# Patient Record
Sex: FEMALE | Race: WHITE | NOT HISPANIC OR LATINO | Employment: UNEMPLOYED | URBAN - METROPOLITAN AREA
[De-identification: names, ages, dates, MRNs, and addresses within clinical notes are randomized per-mention and may not be internally consistent; named-entity substitution may affect disease eponyms.]

---

## 2023-04-03 ENCOUNTER — HOSPITAL ENCOUNTER (EMERGENCY)
Facility: HOSPITAL | Age: 18
Discharge: HOME | End: 2023-04-03
Attending: STUDENT IN AN ORGANIZED HEALTH CARE EDUCATION/TRAINING PROGRAM
Payer: MEDICAID

## 2023-04-03 VITALS
SYSTOLIC BLOOD PRESSURE: 123 MMHG | BODY MASS INDEX: 23.04 KG/M2 | RESPIRATION RATE: 20 BRPM | WEIGHT: 134.94 LBS | DIASTOLIC BLOOD PRESSURE: 74 MMHG | OXYGEN SATURATION: 99 % | HEART RATE: 68 BPM | HEIGHT: 64 IN | TEMPERATURE: 99 F

## 2023-04-03 DIAGNOSIS — S39.012A STRAIN OF LUMBAR REGION, INITIAL ENCOUNTER: Primary | ICD-10-CM

## 2023-04-03 LAB
APPEARANCE UR: CLEAR
B-HCG UR QL: NEGATIVE
BACTERIA #/AREA URNS AUTO: ABNORMAL /HPF
BILIRUB UR QL STRIP.AUTO: NEGATIVE MG/DL
COLOR UR AUTO: COLORLESS
CTP QC/QA: YES
GLUCOSE UR QL STRIP.AUTO: NORMAL MG/DL
HYALINE CASTS #/AREA URNS LPF: ABNORMAL /LPF
KETONES UR QL STRIP.AUTO: NEGATIVE MG/DL
LEUKOCYTE ESTERASE UR QL STRIP.AUTO: NEGATIVE UNIT/L
NITRITE UR QL STRIP.AUTO: NEGATIVE
PH UR STRIP.AUTO: 7 [PH]
PROT UR QL STRIP.AUTO: NEGATIVE MG/DL
RBC #/AREA URNS AUTO: ABNORMAL /HPF
RBC UR QL AUTO: NEGATIVE UNIT/L
SP GR UR STRIP.AUTO: 1
SQUAMOUS #/AREA URNS LPF: ABNORMAL /HPF
UROBILINOGEN UR STRIP-ACNC: NORMAL MG/DL
WBC #/AREA URNS AUTO: ABNORMAL /HPF

## 2023-04-03 PROCEDURE — 99283 EMERGENCY DEPT VISIT LOW MDM: CPT

## 2023-04-03 PROCEDURE — 81025 URINE PREGNANCY TEST: CPT | Performed by: PHYSICIAN ASSISTANT

## 2023-04-03 PROCEDURE — 81001 URINALYSIS AUTO W/SCOPE: CPT | Performed by: PHYSICIAN ASSISTANT

## 2023-04-03 PROCEDURE — 25000003 PHARM REV CODE 250: Performed by: PHYSICIAN ASSISTANT

## 2023-04-03 RX ORDER — INDOMETHACIN 25 MG/1
25 CAPSULE ORAL
Status: COMPLETED | OUTPATIENT
Start: 2023-04-03 | End: 2023-04-03

## 2023-04-03 RX ORDER — BACLOFEN 20 MG/1
20 TABLET ORAL 3 TIMES DAILY PRN
Qty: 20 TABLET | Refills: 0 | Status: SHIPPED | OUTPATIENT
Start: 2023-04-03

## 2023-04-03 RX ORDER — BACLOFEN 10 MG/1
10 TABLET ORAL ONCE
Status: COMPLETED | OUTPATIENT
Start: 2023-04-03 | End: 2023-04-03

## 2023-04-03 RX ADMIN — INDOMETHACIN 25 MG: 25 CAPSULE ORAL at 07:04

## 2023-04-03 RX ADMIN — BACLOFEN 10 MG: 10 TABLET ORAL at 07:04

## 2023-04-03 NOTE — ED PROVIDER NOTES
Encounter Date: 4/3/2023       History     Chief Complaint   Patient presents with    Back Pain     C/o lower back pain x 3 days. States was heavy lifting at work. Hx of back pain     16 yo F present sto ED c/o 3 day hx of diffuse low back pain. Patient reports heavy lifting at work a day before symptoms began. Reports pain radiates into b/l legs & is often burning in nature. Reports similar episode approx 1 month ago. Seen at [facility] ED at that time & told it was likely sciatica & discharged w/ muscle relaxers. Reports pain resolved up until this recent episode. Denies numbness, paresthesia, saddle anesthesia, incontinence, bowel/bladder retention, dysuria, hematuria, abdominal pain, abdominal distension, N/V/D, constipation, blood in stool, appetite changes, unexplained weight loss, night sweats, generalized weakness, fatigue, inability to bear weight/ambulate. VSS on arrival patient in NAD.    Review of patient's allergies indicates:  No Known Allergies  History reviewed. No pertinent past medical history.  History reviewed. No pertinent surgical history.  History reviewed. No pertinent family history.  Social History     Tobacco Use    Smoking status: Every Day     Types: Cigarettes, Vaping with nicotine    Smokeless tobacco: Never   Substance Use Topics    Alcohol use: Never    Drug use: Never     Review of Systems   All other systems reviewed and are negative.    Physical Exam     Initial Vitals [04/03/23 1712]   BP Pulse Resp Temp SpO2   123/74 68 20 98.8 °F (37.1 °C) 99 %      MAP       --         Physical Exam    Nursing note and vitals reviewed.  Constitutional: She appears well-developed and well-nourished. She is not diaphoretic. No distress.   HENT:   Head: Normocephalic and atraumatic.   Mouth/Throat: Oropharynx is clear and moist.   Eyes: Conjunctivae and EOM are normal. Pupils are equal, round, and reactive to light.   Neck: Neck supple.   Normal range of motion.  Cardiovascular:  Normal  rate, regular rhythm, normal heart sounds and intact distal pulses.     Exam reveals no gallop and no friction rub.       No murmur heard.  Pulmonary/Chest: Breath sounds normal. No respiratory distress. She has no wheezes. She has no rhonchi. She has no rales.   Abdominal: Abdomen is soft. Bowel sounds are normal. She exhibits no distension. There is no abdominal tenderness. There is no rebound and no guarding.   Musculoskeletal:         General: No edema.      Cervical back: Normal, normal range of motion and neck supple.      Thoracic back: Normal.      Lumbar back: Tenderness (b/l paraspinal muscles) present. No swelling, edema, deformity, signs of trauma, spasms or bony tenderness. Normal range of motion. Positive right straight leg raise test and positive left straight leg raise test.     Neurological: She is alert and oriented to person, place, and time. She has normal strength and normal reflexes. No sensory deficit.   Skin: Skin is warm and dry. No rash noted. No erythema. No pallor.   Psychiatric: She has a normal mood and affect. Thought content normal.       ED Course   Procedures  Labs Reviewed   URINALYSIS, REFLEX TO URINE CULTURE - Abnormal; Notable for the following components:       Result Value    Color, UA Colorless (*)     Squamous Epithelial Cells, UA Few (*)     All other components within normal limits   POCT URINE PREGNANCY          Imaging Results              X-Ray Lumbar Spine 2 Or 3 Views (Final result)  Result time 04/03/23 19:05:07      Final result by Ethan Gallego MD (04/03/23 19:05:07)                   Impression:      No acute radiographic findings identified.      Electronically signed by: Ethan Gallego  Date:    04/03/2023  Time:    19:05               Narrative:    EXAMINATION:  XR LUMBAR SPINE 2 OR 3 VIEWS    CLINICAL HISTORY:  Back pain    TECHNIQUE:  Frontal and lateral radiographs of the lumbar spine    COMPARISON:  No relevant comparison studies available at the time of  dictation.    FINDINGS:  For the purposes of this report, there are 5 lumbar vertebral bodies with L1 bearing diminutive ribs and partial lumbarization of S1.  Vertebral body heights are maintained.  Straightening of the lumbar lordosis.  No significant listhesis.  Lumbar disc spaces within normal limits.                                       Medications   indomethacin capsule 25 mg (25 mg Oral Given 4/3/23 1906)   baclofen tablet 10 mg (10 mg Oral Given 4/3/23 1906)     Medical Decision Making:   Clinical Tests:   Lab Tests: Ordered and Reviewed  Radiological Study: Ordered and Reviewed  No evidence of infection or stone on UA. XR unremarkable w/o acute osseous abnormality. No evidence of spinal cord compression on exam. Will discharge w/ muscle relaxer for home. Referral placed to IM clinic to establish PCP. ED precautions given for new or worsening symptoms.     APC / Resident Notes:   I was not physically present during the history, exam or disposition of this patient. I was available at all times for consultation. (Zmora)                   Clinical Impression:   Final diagnoses:  [S39.012A] Strain of lumbar region, initial encounter (Primary)        ED Disposition Condition    Discharge Good          ED Prescriptions       Medication Sig Dispense Start Date End Date Auth. Provider    baclofen (LIORESAL) 20 MG tablet Take 1 tablet (20 mg total) by mouth 3 (three) times daily as needed (muscle cramps). 20 tablet 4/3/2023 -- JEFFREY Graf          Follow-up Information       Follow up With Specialties Details Why Contact Info Additional Information    [facility] Medicine Internal Medicine In 2 weeks  [address] [facility] [address] [phone] Internal Medicine Clinic Entrance #1    [facility] - Emergency Dept Emergency Medicine  As needed [address] [facility] [address] [phone]              JEFFREY Graf  04/03/23 1913        Robert Casiano MD  04/04/23 0743

## 2023-04-03 NOTE — Clinical Note
"Vanessa Sancheza" Shiela was seen and treated in our emergency department on 4/3/2023.  She may return to work on 04/04/2023.       If you have any questions or concerns, please don't hesitate to call.      TAB Echevarria RN    "

## 2023-04-04 NOTE — DISCHARGE INSTRUCTIONS
Report to Emergency Department if symptoms return or worsen; [facility] Medicine Clinic Within 1 to 2 days, It is important that you follow up with your primary care provider or specialist if indicated for further evaluation, workup, and treatment as necessary. The exam and treatment you received in Emergency Department was for an urgent problem and NOT INTENDED AS COMPLETE CARE. It is important that you FOLLOW UP with a doctor for ongoing care. If your symptoms become WORSE or you DO NOT IMPROVE and you are unable to reach your health care provider, you should RETURN to the Emergency Department. The Emergency Department provider has provided a PRELIMINARY INTERPRETATION of all your studies. A final interpretation may be done after you are discharged. If a change in your diagnosis or treatment is needed WE WILL CONTACT YOU. It is critical that we have a CURRENT PHONE NUMBER FOR YOU.

## 2024-05-18 ENCOUNTER — HOSPITAL ENCOUNTER (EMERGENCY)
Facility: HOSPITAL | Age: 19
Discharge: HOME | End: 2024-05-18
Attending: EMERGENCY MEDICINE
Payer: COMMERCIAL

## 2024-05-18 VITALS
HEIGHT: 63 IN | HEART RATE: 61 BPM | DIASTOLIC BLOOD PRESSURE: 62 MMHG | OXYGEN SATURATION: 99 % | SYSTOLIC BLOOD PRESSURE: 97 MMHG | WEIGHT: 130 LBS | BODY MASS INDEX: 23.04 KG/M2 | TEMPERATURE: 99 F | RESPIRATION RATE: 18 BRPM
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
## 2024-05-18 DIAGNOSIS — S16.1XXA ACUTE STRAIN OF NECK MUSCLE, INITIAL ENCOUNTER: ICD-10-CM

## 2024-05-18 LAB
ALBUMIN SERPL-MCNC: 4.2 G/DL (ref 3.5–5)
ALBUMIN/GLOB SERPL: 1.3 RATIO (ref 1.1–2)
ALP SERPL-CCNC: 59 UNIT/L (ref 40–150)
ALT SERPL-CCNC: 16 UNIT/L (ref 0–55)
ANION GAP SERPL CALC-SCNC: 12 MEQ/L
APTT PPP: 37.5 SECONDS (ref 23.2–33.7)
AST SERPL-CCNC: 20 UNIT/L (ref 5–34)
B-HCG UR QL: NEGATIVE
BASOPHILS # BLD AUTO: 0.07 X10(3)/MCL
BASOPHILS NFR BLD AUTO: 1.1 %
BILIRUB SERPL-MCNC: 0.4 MG/DL
BUN SERPL-MCNC: 5.2 MG/DL (ref 7–18.7)
CALCIUM SERPL-MCNC: 8.9 MG/DL (ref 8.4–10.2)
CHLORIDE SERPL-SCNC: 111 MMOL/L (ref 98–107)
CO2 SERPL-SCNC: 19 MMOL/L (ref 22–29)
CREAT SERPL-MCNC: 0.64 MG/DL (ref 0.55–1.02)
CREAT/UREA NIT SERPL: 8
CTP QC/QA: YES
EOSINOPHIL # BLD AUTO: 0.01 X10(3)/MCL (ref 0–0.9)
EOSINOPHIL NFR BLD AUTO: 0.2 %
ERYTHROCYTE [DISTWIDTH] IN BLOOD BY AUTOMATED COUNT: 21.5 % (ref 11.5–17)
GFR SERPLBLD CREATININE-BSD FMLA CKD-EPI: >60 ML/MIN/1.73/M2
GLOBULIN SER-MCNC: 3.2 GM/DL (ref 2.4–3.5)
GLUCOSE SERPL-MCNC: 99 MG/DL (ref 74–100)
HCT VFR BLD AUTO: 32 % (ref 37–47)
HGB BLD-MCNC: 9.7 G/DL (ref 12–16)
IMM GRANULOCYTES # BLD AUTO: 0.01 X10(3)/MCL (ref 0–0.04)
IMM GRANULOCYTES NFR BLD AUTO: 0.2 %
INR PPP: 2.3
LYMPHOCYTES # BLD AUTO: 0.77 X10(3)/MCL (ref 0.6–4.6)
LYMPHOCYTES NFR BLD AUTO: 12.3 %
MCH RBC QN AUTO: 23.5 PG (ref 27–31)
MCHC RBC AUTO-ENTMCNC: 30.3 G/DL (ref 33–36)
MCV RBC AUTO: 77.7 FL (ref 80–94)
MONOCYTES # BLD AUTO: 0.77 X10(3)/MCL (ref 0.1–1.3)
MONOCYTES NFR BLD AUTO: 12.3 %
NEUTROPHILS # BLD AUTO: 4.64 X10(3)/MCL (ref 2.1–9.2)
NEUTROPHILS NFR BLD AUTO: 73.9 %
NRBC BLD AUTO-RTO: 0 %
PLATELET # BLD AUTO: 284 X10(3)/MCL (ref 130–400)
PMV BLD AUTO: 9.9 FL (ref 7.4–10.4)
POTASSIUM SERPL-SCNC: 3.5 MMOL/L (ref 3.5–5.1)
PROT SERPL-MCNC: 7.4 GM/DL (ref 6.4–8.3)
PROTHROMBIN TIME: 25.2 SECONDS (ref 12.5–14.5)
RBC # BLD AUTO: 4.12 X10(6)/MCL (ref 4.2–5.4)
SODIUM SERPL-SCNC: 142 MMOL/L (ref 136–145)
WBC # SPEC AUTO: 6.27 X10(3)/MCL (ref 4.5–11.5)

## 2024-05-18 PROCEDURE — 99284 EMERGENCY DEPT VISIT MOD MDM: CPT | Mod: 25

## 2024-05-18 PROCEDURE — 85025 COMPLETE CBC W/AUTO DIFF WBC: CPT

## 2024-05-18 PROCEDURE — 25000003 PHARM REV CODE 250: Performed by: EMERGENCY MEDICINE

## 2024-05-18 PROCEDURE — 85730 THROMBOPLASTIN TIME PARTIAL: CPT

## 2024-05-18 PROCEDURE — 80053 COMPREHEN METABOLIC PANEL: CPT

## 2024-05-18 PROCEDURE — 81025 URINE PREGNANCY TEST: CPT

## 2024-05-18 PROCEDURE — 85610 PROTHROMBIN TIME: CPT

## 2024-05-18 RX ORDER — HYDROCODONE BITARTRATE AND ACETAMINOPHEN 5; 325 MG/1; MG/1
1 TABLET ORAL EVERY 6 HOURS PRN
Qty: 15 TABLET | Refills: 0 | Status: SHIPPED | OUTPATIENT
Start: 2024-05-18

## 2024-05-18 RX ORDER — HYDROCODONE BITARTRATE AND ACETAMINOPHEN 5; 325 MG/1; MG/1
1 TABLET ORAL
Status: COMPLETED | OUTPATIENT
Start: 2024-05-18 | End: 2024-05-18

## 2024-05-18 RX ADMIN — HYDROCODONE BITARTRATE AND ACETAMINOPHEN 1 TABLET: 5; 325 TABLET ORAL at 09:05

## 2024-05-18 NOTE — Clinical Note
"Vanessa Sancheza" Shiela was seen and treated in our emergency department on 5/18/2024.  She may return to work on 05/20/2024.       If you have any questions or concerns, please don't hesitate to call.      KAMALJIT English LPN    "
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
## 2024-09-24 ENCOUNTER — HOSPITAL ENCOUNTER (EMERGENCY)
Facility: HOSPITAL | Age: 19
Discharge: HOME | End: 2024-09-24
Attending: EMERGENCY MEDICINE
Payer: MEDICAID

## 2024-09-24 VITALS
WEIGHT: 140 LBS | BODY MASS INDEX: 23.9 KG/M2 | TEMPERATURE: 98 F | DIASTOLIC BLOOD PRESSURE: 76 MMHG | OXYGEN SATURATION: 100 % | SYSTOLIC BLOOD PRESSURE: 123 MMHG | HEART RATE: 57 BPM | RESPIRATION RATE: 18 BRPM | HEIGHT: 64 IN

## 2024-09-24 DIAGNOSIS — K02.9 DENTAL CARIES: ICD-10-CM

## 2024-09-24 DIAGNOSIS — M26.622 ARTHRALGIA OF LEFT TEMPOROMANDIBULAR JOINT: Primary | ICD-10-CM

## 2024-09-24 LAB — B-HCG UR QL: NEGATIVE

## 2024-09-24 PROCEDURE — 99284 EMERGENCY DEPT VISIT MOD MDM: CPT

## 2024-09-24 PROCEDURE — 81025 URINE PREGNANCY TEST: CPT

## 2024-09-24 RX ORDER — METHYLPREDNISOLONE 4 MG/1
TABLET ORAL
Qty: 21 EACH | Refills: 0 | Status: SHIPPED | OUTPATIENT
Start: 2024-09-24 | End: 2024-10-15

## 2024-09-24 RX ORDER — HYDROCODONE BITARTRATE AND ACETAMINOPHEN 5; 325 MG/1; MG/1
1 TABLET ORAL EVERY 6 HOURS PRN
Qty: 12 TABLET | Refills: 0 | Status: SHIPPED | OUTPATIENT
Start: 2024-09-24

## 2024-09-24 RX ORDER — AMOXICILLIN AND CLAVULANATE POTASSIUM 875; 125 MG/1; MG/1
1 TABLET, FILM COATED ORAL 2 TIMES DAILY
Qty: 14 TABLET | Refills: 0 | Status: SHIPPED | OUTPATIENT
Start: 2024-09-24

## 2024-09-24 NOTE — ED TRIAGE NOTES
Pt complaint of pain to left jaw, especially at opening and clenching of jaw at waking this am without known injury.

## 2024-09-24 NOTE — DISCHARGE INSTRUCTIONS
For pain, take Medrol Dosepak as instructed by the pharmacy.  During the day you may take Tylenol.  For worsening pain, okay to take Norco every 6 hours.  This medication may make you drowsy     For dental caries, take Augmentin as instructed.  Please take antibiotic to completion to avoid any complications or reinfection.  Recommend follow up with a dentist.      Return to ER as needed.

## 2024-09-24 NOTE — Clinical Note
"Vanessa Sancheza"CarminaShiela was seen and treated in our emergency department on 9/24/2024.  She may return to work on 09/25/2024.       If you have any questions or concerns, please don't hesitate to call.      Kylah CLEMENS    "

## 2024-09-24 NOTE — ED PROVIDER NOTES
Encounter Date: 9/24/2024       History     Chief Complaint   Patient presents with    Jaw Pain     Pt complaint of pain to left jaw, especially at opening and clenching of jaw at waking this am without known injury.      See [facility] for details     The history is provided by the patient.     Review of patient's allergies indicates:  No Known Allergies  Past Medical History:   Diagnosis Date    Aortic valve insufficiency      Past Surgical History:   Procedure Laterality Date    AORTIC VALVE REPLACEMENT      2021     No family history on file.  Social History     Tobacco Use    Smoking status: Every Day     Types: Cigarettes, Vaping with nicotine    Smokeless tobacco: Never   Substance Use Topics    Alcohol use: Never    Drug use: Never     Review of Systems   Constitutional:  Negative for chills and fever.   HENT:  Negative for dental problem, ear discharge, ear pain and sore throat.         Jaw pain   Respiratory:  Negative for shortness of breath.    Cardiovascular:  Negative for chest pain.   All other systems reviewed and are negative.      Physical Exam     Initial Vitals [09/24/24 1802]   BP Pulse Resp Temp SpO2   123/76 (!) 57 18 98.1 °F (36.7 °C) 100 %      MAP       --         Physical Exam    Nursing note and vitals reviewed.  Constitutional: She appears well-developed and well-nourished. No distress.   HENT:   Head: Normocephalic and atraumatic.   Right Ear: Tympanic membrane and ear canal normal.   Left Ear: Tympanic membrane and ear canal normal.   Nose: Nose normal.   Mouth/Throat: Uvula is midline, oropharynx is clear and moist and mucous membranes are normal. Dental caries present.       Eyes: Conjunctivae and EOM are normal.   Neck:   Normal range of motion.  Cardiovascular:  Normal rate.           Pulmonary/Chest: No respiratory distress.   Musculoskeletal:         General: Normal range of motion.      Cervical back: Normal range of motion.      Comments: TTP to left TMJ, no crepitus or abnormal  movement demonstrated on exam. No clicking.      Neurological: She is alert and oriented to person, place, and time.   Skin: Skin is warm and dry.   Psychiatric: She has a normal mood and affect. Thought content normal.         ED Course   Procedures  Labs Reviewed   PREGNANCY TEST, URINE RAPID - Normal       Result Value    hCG Qualitative, Urine Negative            Imaging Results    None          Medications - No data to display  Medical Decision Making  18-year-old female presents to the ER for evaluation of left jaw pain x1 day.  Patient reports that she woke up this morning with significant left-sided jaw pain.  She reports that the pain has been constant.  She reports worsening pain whenever she tries to open or close her mouth.  She also reports that she has been unable to choose secondary to her pain.  She does report some intermittent clicking sensations.  She denies any history of this problem in the past.  She denies any known dental problems.    On physical exam, the patient is tender to palpation to the left TMJ.  She does not have any crepitus or abnormal movement on exam.  She does have some decreased range of motion to the jaw secondary to pain.  On evaluation of her mouth, she does have a large dental bharathi to the left most posterior molar.  Patient denies any tenderness to this tooth or drainage.  She is unsure when she last saw a dentist.  Given the patient's symptoms, suspect TMJ versus dental bharathi is causing her pain.  At this time, recommend treatment with Medrol Dosepak as the patient is on blood thinner secondary to a mechanical valve replacement.  We will also give her a short course of pain medication help with worsening pain.  Given the patient's large dental bharathi on exam I can not completely rule out this is a etiology of her pain.  We will place on cephalexin to avoid any formation of large dental abscess or worsening infection.  Recommend follow up with a dentist.  We also discussed  conservative measures and TMJ exercises she can try at home to help with her discomfort.  Patient verbalized her understanding of this and we will be discharged home.    Amount and/or Complexity of Data Reviewed  Labs:  Decision-making details documented in ED Course.    Risk  Prescription drug management.      Additional MDM:   Differential Diagnosis:   Other: The following diagnoses were also considered and will be evaluated: Dental abscess, Jaw dislocation and Injury.            ED Course as of 09/24/24 1859   Tue Sep 24, 2024   1838 hCG Qualitative, Urine: Negative [LM]      ED Course User Index  [LM] Kathi Bah PA                           Clinical Impression:  Final diagnoses:  [M26.622] Arthralgia of left temporomandibular joint (Primary)  [K02.9] Dental caries          ED Disposition Condition    Discharge Stable          ED Prescriptions       Medication Sig Dispense Start Date End Date Auth. Provider    HYDROcodone-acetaminophen (NORCO) 5-325 mg per tablet Take 1 tablet by mouth every 6 (six) hours as needed for Pain. 12 tablet 9/24/2024 -- Kathi Bah PA    methylPREDNISolone (MEDROL DOSEPACK) 4 mg tablet use as directed 21 each 9/24/2024 10/15/2024 Kathi Bah PA    amoxicillin-clavulanate 875-125mg (AUGMENTIN) 875-125 mg per tablet Take 1 tablet by mouth 2 (two) times daily. 14 tablet 9/24/2024 -- Kathi Bah PA          Follow-up Information       Follow up With Specialties Details Why Contact Info    Primary Care  Call in 1 day to get set up with primary care provider Call [phone] to set up primary care appointment if you do not have a primary care provider             Kathi Bah PA  09/24/24 1859

## 2025-01-07 ENCOUNTER — HOSPITAL ENCOUNTER (EMERGENCY)
Facility: HOSPITAL | Age: 20
Discharge: HOME | End: 2025-01-07
Attending: FAMILY MEDICINE

## 2025-01-07 VITALS
DIASTOLIC BLOOD PRESSURE: 76 MMHG | WEIGHT: 140 LBS | SYSTOLIC BLOOD PRESSURE: 124 MMHG | HEIGHT: 64 IN | RESPIRATION RATE: 18 BRPM | OXYGEN SATURATION: 100 % | HEART RATE: 72 BPM | TEMPERATURE: 98 F | BODY MASS INDEX: 23.9 KG/M2

## 2025-01-07 DIAGNOSIS — Z51.89 WOUND CHECK, ABSCESS: Primary | ICD-10-CM

## 2025-01-07 PROCEDURE — 99281 EMR DPT VST MAYX REQ PHY/QHP: CPT

## 2025-01-07 NOTE — ED PROVIDER NOTES
Encounter Date: 1/7/2025       History     Chief Complaint   Patient presents with    Wound Check     Wound check to make sure all packing is removed from wound.  I&D on Saturday for abscess to right buttock.       This patient is a 19-year-old female who had a recent I&D of a pilonidal cyst.  This was her 2nd time having an infection in his area.  States that she remove the packing yesterday but is unsure if she removed all of it.  I and D was done    The history is provided by the patient.     Review of patient's allergies indicates:  No Known Allergies  Past Medical History:   Diagnosis Date    Aortic valve insufficiency      Past Surgical History:   Procedure Laterality Date    AORTIC VALVE REPLACEMENT      2021     No family history on file.  Social History     Tobacco Use    Smoking status: Every Day     Types: Cigarettes, Vaping with nicotine    Smokeless tobacco: Never   Substance Use Topics    Alcohol use: Never    Drug use: Never     Review of Systems   Constitutional: Negative.    HENT: Negative.     Respiratory: Negative.     Cardiovascular: Negative.    Endocrine: Negative.    Skin:  Positive for wound.   Neurological: Negative.    Psychiatric/Behavioral: Negative.     All other systems reviewed and are negative.      Physical Exam     Initial Vitals [01/07/25 1125]   BP Pulse Resp Temp SpO2   126/61 75 18 97.5 °F (36.4 °C) 100 %      MAP       --         Physical Exam    Nursing note and vitals reviewed.  Constitutional: She appears well-developed.   HENT:   Head: Normocephalic.   Eyes: Pupils are equal, round, and reactive to light.   Neck:   Normal range of motion.  Cardiovascular:  Normal rate, regular rhythm and normal heart sounds.           Pulmonary/Chest: Breath sounds normal.   Abdominal: Abdomen is soft.   Musculoskeletal:         General: Normal range of motion.      Cervical back: Normal range of motion.        Legs:       Comments: Patient has a tiny 1 cm incision below the sacral area,  there is slight erythema surrounding but the area looks very good.  There was minimal drainage and there is no evidence of any iodoform packing.     Neurological: She is alert and oriented to person, place, and time.   Skin: Skin is warm and dry.   Psychiatric: She has a normal mood and affect.         ED Course   Procedures  Labs Reviewed - No data to display       Imaging Results    None          Medications - No data to display  Medical Decision Making  This patient is a 19-year-old female who had an I and D of a pilonidal cyst 3 days ago.  States that she remove the packing yesterday in his unsure if she removed all of the packing.  Area looks to be healing well and no packing seen  Differential diagnosis: Wound check                                      Clinical Impression:  Final diagnoses:  [Z51.89] Wound check, abscess (Primary)          ED Disposition Condition    Discharge Stable          ED Prescriptions    None       Follow-up Information       Follow up With Specialties Details Why Contact Info    PCP  Schedule an appointment as soon as possible for a visit in 1 week               José Ren MD  01/07/25 1400

## 2025-01-07 NOTE — ED NOTES
Pilonidal abscess, lanced 3 days ago. Orders to remove packing today. They did so, but uncertain if all the packing was removed. No other complaints. Patient on antibiotics.